# Patient Record
Sex: MALE | Race: ASIAN | NOT HISPANIC OR LATINO | URBAN - METROPOLITAN AREA
[De-identification: names, ages, dates, MRNs, and addresses within clinical notes are randomized per-mention and may not be internally consistent; named-entity substitution may affect disease eponyms.]

---

## 2019-02-22 ENCOUNTER — OFFICE VISIT (OUTPATIENT)
Dept: FAMILY MEDICINE | Facility: CLINIC | Age: 34
End: 2019-02-22
Payer: COMMERCIAL

## 2019-02-22 VITALS
WEIGHT: 186 LBS | SYSTOLIC BLOOD PRESSURE: 101 MMHG | TEMPERATURE: 97.2 F | HEART RATE: 69 BPM | DIASTOLIC BLOOD PRESSURE: 90 MMHG | BODY MASS INDEX: 29.19 KG/M2 | OXYGEN SATURATION: 97 % | HEIGHT: 67 IN

## 2019-02-22 DIAGNOSIS — R51.9 NONINTRACTABLE EPISODIC HEADACHE, UNSPECIFIED HEADACHE TYPE: Primary | ICD-10-CM

## 2019-02-22 PROCEDURE — 99214 OFFICE O/P EST MOD 30 MIN: CPT | Performed by: FAMILY MEDICINE

## 2019-02-22 SDOH — HEALTH STABILITY: MENTAL HEALTH: HOW OFTEN DO YOU HAVE A DRINK CONTAINING ALCOHOL?: NEVER

## 2019-02-22 ASSESSMENT — MIFFLIN-ST. JEOR: SCORE: 1734.93

## 2019-02-22 NOTE — PROGRESS NOTES
SUBJECTIVE:   Felipe Bradley is a 33 year old male who presents to clinic today for the following health issues:      Headache and jaw pain   Onset:  4 days     Description:   Location: unilateral in the left temporal area   Character: dull  Frequency:  varies  Duration:  4 days     Intensity: mild    Progression of Symptoms:  worsening    Accompanying Signs & Symptoms:  Stiff neck: no   Neck or upper back pain: YES  Fever: no   Sinus pressure: no   Nausea or vomiting: no   Dizziness: no   Numbness: no   Weakness: no   Visual changes: no     History:   Head trauma: no   Family history of migraines: no   Previous tests for headaches: no   Neurologist evaluations: no   Able to do daily activities: YES  Wake with a headaches: no   Do headaches wake you up: no   Daily pain medication use: no   Work/school stressors/changes: no     Precipitating factors:   Does light make it worse: no   Does sound make it worse: no     Alleviating factors:  Does sleep help: YES    Therapies Tried and outcome: none     No vision changes.  No association with any light or change in sound frequency.  Denies any hearing issues.  No recent fall or trauma.  No neck stiffness    Problem list and histories reviewed & adjusted, as indicated.      There is no problem list on file for this patient.    No past surgical history on file.    Social History     Tobacco Use     Smoking status: Never Smoker     Smokeless tobacco: Never Used   Substance Use Topics     Alcohol use: No     Frequency: Never     No family history on file.      No current outpatient medications on file.       Reviewed and updated as needed this visit by clinical staff  Allergies  Meds       Reviewed and updated as needed this visit by Provider         ROS:  CONSTITUTIONAL: NEGATIVE for fever, chills, change in weight  ENT/MOUTH: NEGATIVE for ear, mouth and throat problems  RESP: NEGATIVE for significant cough or SOB  CV: NEGATIVE for chest pain, palpitations or peripheral  "edema    OBJECTIVE:                                                    /90   Pulse 69   Temp 97.2  F (36.2  C) (Oral)   Ht 1.69 m (5' 6.54\")   Wt 84.4 kg (186 lb)   SpO2 97%   BMI 29.54 kg/m    Body mass index is 29.54 kg/m .   GENERAL: healthy, alert, well nourished, well hydrated, no distress  HENT: ear canals- normal; TMs- normal; Nose- normal; Mouth- no ulcers, no lesions  NECK: no tenderness, no adenopathy, no asymmetry, no masses, no stiffness; thyroid- normal to palpation  RESP: lungs clear to auscultation - no rales, no rhonchi, no wheezes  CV: regular rates and rhythm, normal S1 S2, no S3 or S4 and no murmur, no click or rub -  ABDOMEN: soft, no tenderness, no  hepatosplenomegaly, no masses, normal bowel sounds  NEURO: strength and tone- normal, sensory exam- grossly normal, mentation- intact, speech- normal, reflexes- symmetric  No rash apparent.     ASSESSMENT/PLAN:                                                        ICD-10-CM    1. Nonintractable episodic headache, unspecified headache type R51 NEUROLOGY ADULT REFERRAL     Patient does not have any neurological symptoms.  There is no vision changes there is no rashes or any other etiology.  Symptoms could be episodic headaches versus stress related headache.  Due to unilateral symptoms and not improving I suggested he can try some ibuprofen and see a neurologist for further evaluation.  Warning signs were discussed with the patient.  Discussed briefly about any rash which appear in that area he needs to be seen immediately.  Other symptoms including unilateral eye discomfort or any headache which is intractable and not improving and worsening in short duration, he needs to be seen sooner.  Brendon Mendenhall MD  [facility] [place]    "

## 2019-11-26 ENCOUNTER — TRANSFERRED RECORDS (OUTPATIENT)
Dept: HEALTH INFORMATION MANAGEMENT | Facility: CLINIC | Age: 34
End: 2019-11-26

## 2019-12-20 ENCOUNTER — HOSPITAL ENCOUNTER (OUTPATIENT)
Dept: CT IMAGING | Facility: CLINIC | Age: 34
Discharge: HOME | End: 2019-12-20
Attending: PSYCHIATRY & NEUROLOGY | Admitting: PSYCHIATRY & NEUROLOGY
Payer: COMMERCIAL

## 2019-12-20 DIAGNOSIS — R22.0 SCALP MASS: ICD-10-CM

## 2019-12-20 DIAGNOSIS — R51.9 HEADACHE: ICD-10-CM

## 2019-12-20 PROCEDURE — 70450 CT HEAD/BRAIN W/O DYE: CPT

## 2020-01-09 ENCOUNTER — PRE VISIT (OUTPATIENT)
Dept: NEUROLOGY | Facility: CLINIC | Age: 35
End: 2020-01-09

## 2020-01-09 NOTE — TELEPHONE ENCOUNTER
FUTURE VISIT INFORMATION      FUTURE VISIT INFORMATION:    Date: 2/10/2020    Time: 2pm    Location: [facility]  REFERRAL INFORMATION:    Referring provider:  Dr. Recio     Referring providers clinic:  [facility]     Reason for visit/diagnosis  Headaches     RECORDS REQUESTED FROM:       Clinic name Comments Records Status Imaging Status   [facility]   Scanned to media tab N/A     * No Records in Care Everywhere*

## 2020-01-23 ENCOUNTER — TRANSFERRED RECORDS (OUTPATIENT)
Dept: HEALTH INFORMATION MANAGEMENT | Facility: CLINIC | Age: 35
End: 2020-01-23

## 2020-02-10 ENCOUNTER — OFFICE VISIT (OUTPATIENT)
Dept: NEUROLOGY | Facility: CLINIC | Age: 35
End: 2020-02-10
Attending: FAMILY MEDICINE
Payer: COMMERCIAL

## 2020-02-10 VITALS
SYSTOLIC BLOOD PRESSURE: 130 MMHG | HEART RATE: 73 BPM | OXYGEN SATURATION: 98 % | WEIGHT: 187.39 LBS | DIASTOLIC BLOOD PRESSURE: 85 MMHG | BODY MASS INDEX: 29.76 KG/M2

## 2020-02-10 DIAGNOSIS — R93.0 ABNORMAL HEAD CT: ICD-10-CM

## 2020-02-10 DIAGNOSIS — G44.209 TENSION HEADACHE: Primary | ICD-10-CM

## 2020-02-10 RX ORDER — AMITRIPTYLINE HYDROCHLORIDE 10 MG/1
10 TABLET ORAL AT BEDTIME
Qty: 90 TABLET | Refills: 3 | Status: SHIPPED | OUTPATIENT
Start: 2020-02-10 | End: 2021-04-06

## 2020-02-10 ASSESSMENT — ENCOUNTER SYMPTOMS
TASTE DISTURBANCE: 0
SMELL DISTURBANCE: 0
POOR WOUND HEALING: 0
SINUS CONGESTION: 0
SORE THROAT: 0
TROUBLE SWALLOWING: 0
SINUS PAIN: 0
SKIN CHANGES: 0
NAIL CHANGES: 1
HOARSE VOICE: 0
NECK MASS: 0

## 2020-02-10 ASSESSMENT — PAIN SCALES - GENERAL: PAINLEVEL: MILD PAIN (3)

## 2020-02-10 ASSESSMENT — HEADACHE IMPACT TEST (HIT 6)
HOW OFTEN DO HEADACHES LIMIT YOUR DAILY ACTIVITIES: NEVER
HOW OFTEN HAVE YOU FELT FED UP OR IRRITATED BECAUSE OF YOUR HEADACHES: RARELY
HIT6 TOTAL SCORE: 40
WHEN YOU HAVE A HEADACHE HOW OFTEN DO YOU WISH YOU COULD LIE DOWN: NEVER
HOW OFTEN HAVE YOU FELT TOO TIRED TO WORK BECAUSE OF YOUR HEADACHES: NEVER
HOW OFTEN DID HEADACHS LIMIT CONCENTRATION ON WORK OR DAILY ACTIVITY: NEVER
WHEN YOU HAVE HEADACHES HOW OFTEN IS THE PAIN SEVERE: RARELY

## 2020-02-10 NOTE — PROGRESS NOTES
[facility]    Felipe Bradley MRN# [id number]   Age: 34 year old YOB: 1985     Requesting physician: Brendon Tejeda            Assessment and Plan:     1) Tension Headache  2) Abnormal head CT    I saw the patient accompanied by Dr. Spangler, PGY 4.  He has documented the HPI and physical examination and I have edited for content and accuracy. I have personally confirmed all details of history, examination and assessment and plan are my own.    The patient is describing a ocular contracture headache or tension headache that is running along the right side of his head.  He was reassured that it sounds benign in nature.  Unfortunately I think the anxiety over the palpable bump he has in his skull is making him feel much worse and triggering the headaches.    I reviewed the images with the patient on CT scan of the head.  Apparently he had a CT scan of the head back in 2015 and we will try to obtain those images to compare.  If not to provide further reassurance to the patient this is a benign lesion we can do serial monitoring with a repeat CT scan in June.    In the meantime the patient was recommended if he has difficulty managing his anxiety he should see his primary care physician.    To help with the headaches patient will be initiated on amitriptyline 10 mg p.o. nightly.  He was counseled regarding possible side effects related to this medication.  If this does not work a trial of gabapentin could be undertaken.    I will call the patient once I have outside images to compare.  No further work-up is suggested at this time and no further neurological follow-up is scheduled until outside images have been reviewed.    Shruti Singh MD [facility]  Department of Neurology  Pager [phone]             History of Present Illness:     chief complaint: Headache     Felipe Bradley is a 34 year old gentleman presenting for assessment of headache since summer 2019.  The headache is in  association with a bump that the patient palpates on the right frontal aspect of the head behind the hairline.  He initially presented to [facility], [facility] for evaluation of these headaches and the bump.  An MRI scan of the brain was initially performed followed by a CT scan.  The CT scan is the only image we have available for review today and this was performed December 20, 2019.  There is a right frontal bone abnormality measuring 1.3 cm x 0.8 cm in the outer cortex of the right frontal bone.  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  The patient reports that he has significant distress over this bump despite having been told it is benign.  He reports that numerous family members have expressed concern that no one is doing anything regarding this possible benign tumor.  He reports that he is delayed purchasing a house due to fear that this is something that will shorten his life span and make quality of life difficult.  Unfortunately in that setting he is noticed that his headaches have become worse and more disruptive to life.    Current headache symptoms:  Frequency: weeks at a time where it will flare up especially since November 2019. Will occur typically 2 weeks per month. It has now been constant now for last 2 days.  Quality: achy, pulling sensation  Location: right side of head, radiates to back of neck, ear, and jaw the patient traces a pattern that follows musculature  Duration: Typically 2-3 days but up to weeks  Associated symptoms: No photophobia, no phonophobia, nausea, vomiting. No autonomic symptoms associated with headache. No aura.   Awakens from sleep due to sx's:  No  Precipitating Injury:  No      No exacerbating factors or triggers per the patient.  History suggests anxiety is provoking worse headaches. No history of teeth grinding.  Pain improves on its own. No medication required.  Pain is bothersome but has not  missed work or other functions.  The patient is not sleeping well due to anxiousness. He stated his family is worried and he has put buying a house on hold due to the headache.  He stated his stress is currently a 7/10 due to headache.    Previous Treatment Trials:  Medrol dose pack (no improvement)      Caffeine: 1x week  Sleep: 7-8 hours   Exercise: minimal  H2O: 2 20oz per day         Physical Exam:   /85 (BP Location: Left arm, Patient Position: Sitting, Cuff Size: Adult Large)   Pulse 73   Wt 85 kg (187 lb 6.3 oz)   SpO2 98%   BMI 29.76 kg/m      General Appearance: Sitting upright in chair and in no acute distress  Neurological Examination:  Cognition and Orientation: The patient is oriented to person, place and self. Normal attention and recall. No aphasia or dysarthria  Cranial Nerves: 2-12 intact. Funduscopic examination was normal with sharp disc margins visualized.  General Motor Survey: The patient has normal muscle bulk, tone and strength in all four extremities. No tremor present.   Reflexes: Upper and lower extremity reflexes are within normal limits and bilaterally symmetric. Plantar responses are flexor.  Coordination: Normal coordination of finger to nose and heel-knee-shin.   Gait: Normal gait. Romberg negative. Able to walk on toes with normal tandem walk  Sensory: Normal sensation to light touch in all four extremities  Cardiovascular Examination:   Regular rate and rhythm of heart. No significant edema in lower extremities.   Musculoskeletal examination:  Neck range of motion is normal.  The patient sits with poor posture.  He has some additional muscle spasm in his right masseter muscle and mild hypertrophy when compared to the left.         Data:   All laboratory data reviewed  All imaging studies reviewed by me    DATA for DOCUMENTATION:    CT head w/o contrast (12/20/19): normal brain parenchyma. 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          Past Medical History:     H/o panic attacks/Anxiety    Also see scanned health assessment forms.       Past Surgical History:   No prior surgery         Social History:     Social History     Socioeconomic History     Marital status:      Spouse name: Not on file     Number of children: Not on file     Years of education: Not on file     Highest education level: Not on file   Occupational History     Not on file   Social Needs     Financial resource strain: Not on file     Food insecurity:     Worry: Not on file     Inability: Not on file     Transportation needs:     Medical: Not on file     Non-medical: Not on file   Tobacco Use     Smoking status: Never Smoker     Smokeless tobacco: Never Used   Substance and Sexual Activity     Alcohol use: No     Frequency: Never     Drug use: No     Sexual activity: Yes   Lifestyle     Physical activity:     Days per week: Not on file     Minutes per session: Not on file     Stress: Not on file   Relationships     Social connections:     Talks on phone: Not on file     Gets together: Not on file     Attends [employer] service: Not on file     Active member of club or organization: Not on file     Attends meetings of clubs or organizations: Not on file     Relationship status: Not on file     Intimate partner violence:     Fear of current or ex partner: Not on file     Emotionally abused: Not on file     Physically abused: Not on file     Forced sexual activity: Not on file   Other Topics Concern     Not on file   Social History Narrative     Not on file     No smoking or alcohol use         Family History:   No family history of headache  Father has history of Coronary artery disease         Medications:     No active medications         Review of Systems:   A comprehensive 10 point review of systems (constitutional, ENT, cardiac, peripheral vascular, lymphatic, respiratory, GI, , Musculoskeletal, skin, Neurological) was performed and found to be negative except as  described in this note.     See intake form completed by patient    Answers for HPI/ROS submitted by the patient on 2/10/2020   General Symptoms: No  Skin Symptoms: Yes  HENT Symptoms: Yes  EYE SYMPTOMS: No  HEART SYMPTOMS: No  LUNG SYMPTOMS: No  INTESTINAL SYMPTOMS: No  URINARY SYMPTOMS: No  REPRODUCTIVE SYMPTOMS: No  SKELETAL SYMPTOMS: No  BLOOD SYMPTOMS: No  NERVOUS SYSTEM SYMPTOMS: No  MENTAL HEALTH SYMPTOMS: No  Changes in hair: No  Changes in moles/birth marks: No  Itching: No  Rashes: No  Changes in nails: Yes  Acne: No  Change in facial hair: No  Warts: No  Non-healing sores: No  Scarring: No  Flaking of skin: No  Color changes of hands/feet in cold : No  Sun sensitivity: No  Skin thickening: No  Ear pain: No  Ear discharge: No  Hearing loss: No  Tinnitus: No  Nosebleeds: No  Congestion: No  Sinus pain: No  Trouble swallowing: No   Voice hoarseness: No  Mouth sores: No  Sore throat: No  Tooth pain: No  Gum tenderness: No  Bleeding gums: No  Change in taste: No  Change in sense of smell: No  Dry mouth: No  Hearing aid used: No  Neck lump: No

## 2020-02-10 NOTE — NURSING NOTE
Chief Complaint   Patient presents with     Headache     [facility] NEW - HEADACHES       Carlos Alberto De Anda, EMT

## 2020-02-10 NOTE — LETTER
2/10/2020       RE: Felipe Bradley  [address]  [place] [address]     Dear Colleague,    Thank you for referring your patient, Felipe Bradley, to the [facility] NEUROLOGY at [facility]. Please see a copy of my visit note below.        [facility]    Felipe Bradley MRN# [id number]   Age: 34 year old YOB: 1985     Requesting physician: Brendon Tejeda            Assessment and Plan:     1) Tension Headache  2) Abnormal head CT    I saw the patient accompanied by Dr. Spangler, PGY 4.  He has documented the HPI and physical examination and I have edited for content and accuracy. I have personally confirmed all details of history, examination and assessment and plan are my own.    The patient is describing a ocular contracture headache or tension headache that is running along the right side of his head.  He was reassured that it sounds benign in nature.  Unfortunately I think the anxiety over the palpable bump he has in his skull is making him feel much worse and triggering the headaches.    I reviewed the images with the patient on CT scan of the head.  Apparently he had a CT scan of the head back in 2015 and we will try to obtain those images to compare.  If not to provide further reassurance to the patient this is a benign lesion we can do serial monitoring with a repeat CT scan in June.    In the meantime the patient was recommended if he has difficulty managing his anxiety he should see his primary care physician.    To help with the headaches patient will be initiated on amitriptyline 10 mg p.o. nightly.  He was counseled regarding possible side effects related to this medication.  If this does not work a trial of gabapentin could be undertaken.    I will call the patient once I have outside images to compare.  No further work-up is suggested at this time and no further neurological follow-up is scheduled until outside images have  been reviewed.    Shruti Singh MD [facility]  Department of Neurology  Pager [phone]             History of Present Illness:     chief complaint: Headache     Felipe Bradley is a 34 year old gentleman presenting for assessment of headache since summer 2019.  The headache is in association with a bump that the patient palpates on the right frontal aspect of the head behind the hairline.  He initially presented to [facility], [facility] for evaluation of these headaches and the bump.  An MRI scan of the brain was initially performed followed by a CT scan.  The CT scan is the only image we have available for review today and this was performed December 20, 2019.  There is a right frontal bone abnormality measuring 1.3 cm x 0.8 cm in the outer cortex of the right frontal bone.  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  The patient reports that he has significant distress over this bump despite having been told it is benign.  He reports that numerous family members have expressed concern that no one is doing anything regarding this possible benign tumor.  He reports that he is delayed purchasing a house due to fear that this is something that will shorten his life span and make quality of life difficult.  Unfortunately in that setting he is noticed that his headaches have become worse and more disruptive to life.    Current headache symptoms:  Frequency: weeks at a time where it will flare up especially since November 2019. Will occur typically 2 weeks per month. It has now been constant now for last 2 days.  Quality: achy, pulling sensation  Location: right side of head, radiates to back of neck, ear, and jaw the patient traces a pattern that follows musculature  Duration: Typically 2-3 days but up to weeks  Associated symptoms: No photophobia, no phonophobia, nausea, vomiting. No autonomic symptoms associated with headache. No aura.   Awakens from  sleep due to sx's:  No  Precipitating Injury:  No      No exacerbating factors or triggers per the patient.  History suggests anxiety is provoking worse headaches. No history of teeth grinding.  Pain improves on its own. No medication required.  Pain is bothersome but has not missed work or other functions.  The patient is not sleeping well due to anxiousness. He stated his family is worried and he has put buying a house on hold due to the headache.  He stated his stress is currently a 7/10 due to headache.    Previous Treatment Trials:  Medrol dose pack (no improvement)      Caffeine: 1x week  Sleep: 7-8 hours   Exercise: minimal  H2O: 2 20oz per day         Physical Exam:   /85 (BP Location: Left arm, Patient Position: Sitting, Cuff Size: Adult Large)   Pulse 73   Wt 85 kg (187 lb 6.3 oz)   SpO2 98%   BMI 29.76 kg/m       General Appearance: Sitting upright in chair and in no acute distress  Neurological Examination:  Cognition and Orientation: The patient is oriented to person, place and self. Normal attention and recall. No aphasia or dysarthria  Cranial Nerves: 2-12 intact. Funduscopic examination was normal with sharp disc margins visualized.  General Motor Survey: The patient has normal muscle bulk, tone and strength in all four extremities. No tremor present.   Reflexes: Upper and lower extremity reflexes are within normal limits and bilaterally symmetric. Plantar responses are flexor.  Coordination: Normal coordination of finger to nose and heel-knee-shin.   Gait: Normal gait. Romberg negative. Able to walk on toes with normal tandem walk  Sensory: Normal sensation to light touch in all four extremities  Cardiovascular Examination:   Regular rate and rhythm of heart. No significant edema in lower extremities.   Musculoskeletal examination:  Neck range of motion is normal.  The patient sits with poor posture.  He has some additional muscle spasm in his right masseter muscle and mild hypertrophy  when compared to the left.         Data:   All laboratory data reviewed  All imaging studies reviewed by me    DATA for DOCUMENTATION:    CT head w/o contrast (12/20/19): normal brain parenchyma. 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         Past Medical History:     H/o panic attacks/Anxiety    Also see scanned health assessment forms.       Past Surgical History:   No prior surgery         Social History:     Social History     Socioeconomic History     Marital status:      Spouse name: Not on file     Number of children: Not on file     Years of education: Not on file     Highest education level: Not on file   Occupational History     Not on file   Social Needs     Financial resource strain: Not on file     Food insecurity:     Worry: Not on file     Inability: Not on file     Transportation needs:     Medical: Not on file     Non-medical: Not on file   Tobacco Use     Smoking status: Never Smoker     Smokeless tobacco: Never Used   Substance and Sexual Activity     Alcohol use: No     Frequency: Never     Drug use: No     Sexual activity: Yes   Lifestyle     Physical activity:     Days per week: Not on file     Minutes per session: Not on file     Stress: Not on file   Relationships     Social connections:     Talks on phone: Not on file     Gets together: Not on file     Attends Rastafarian service: Not on file     Active member of club or organization: Not on file     Attends meetings of clubs or organizations: Not on file     Relationship status: Not on file     Intimate partner violence:     Fear of current or ex partner: Not on file     Emotionally abused: Not on file     Physically abused: Not on file     Forced sexual activity: Not on file   Other Topics Concern     Not on file   Social History Narrative     Not on file     No smoking or alcohol use         Family History:   No family history of headache  Father has history of Coronary artery disease          Medications:     No active medications         Review of Systems:   A comprehensive 10 point review of systems (constitutional, ENT, cardiac, peripheral vascular, lymphatic, respiratory, GI, , Musculoskeletal, skin, Neurological) was performed and found to be negative except as described in this note.     See intake form completed by patient    Again, thank you for allowing me to participate in the care of your patient.      Sincerely,    Shruti Singh MD

## 2020-09-21 ENCOUNTER — TELEPHONE (OUTPATIENT)
Dept: NEUROLOGY | Facility: CLINIC | Age: 35
End: 2020-09-21

## 2020-09-21 NOTE — TELEPHONE ENCOUNTER
[facility] Call Center    Phone Message    May a detailed message be left on voicemail: yes     Reason for Call: Medication Question or concern regarding medication   Prescription Clarification  Name of Medication: amitriptyline (ELAVIL) 10 MG tablet [1127] (Order [id number])   Prescribing Provider: Dr. Singh    What on the order needs clarification? Patient is calling to talk with Dr. Singh - Patient stated that he stopped the medication due to trying to have a baby. Patient stated that he is starting to get headaches again is wanting Dr. Singh advice.    Please advise          Action Taken: Other:  NEUROLOGY     Travel Screening: Not Applicable

## 2020-09-21 NOTE — TELEPHONE ENCOUNTER
I called pt to discuss his phone message. He let me know he stopped his amitriptyline a month ago when him and his wife started trying to have a baby. He is wondering if he does need to stop amitriptyline while trying to conceive or if he can resume taking it. Prior to stopping his headaches were well managed.     If he should stop what recommendations does the provider have.     I will pass his questions along to Dr. Singh and let pt know what she advices.     Georgina BOBO

## 2020-09-22 ENCOUNTER — TELEPHONE (OUTPATIENT)
Dept: NEUROLOGY | Facility: CLINIC | Age: 35
End: 2020-09-22

## 2020-09-22 NOTE — TELEPHONE ENCOUNTER
"I called pt with an update from Dr. Singh regarding his amitriptyline. \"The patient is safe to take medicine while he and his wife are trying to conceive. It would not affect the baby's health. I would suggest he restart \". He understands and will restart the medication. I let him know he can call back if he has any other questions or has continued headache.     Georgina BOBO  "

## 2021-02-20 ENCOUNTER — TELEPHONE (OUTPATIENT)
Dept: NEUROLOGY | Facility: CLINIC | Age: 36
End: 2021-02-20

## 2021-02-20 DIAGNOSIS — G44.209 TENSION HEADACHE: Primary | ICD-10-CM

## 2021-02-20 RX ORDER — AMITRIPTYLINE HYDROCHLORIDE 10 MG/1
10 TABLET ORAL AT BEDTIME
Qty: 30 TABLET | Refills: 0 | Status: SHIPPED | OUTPATIENT
Start: 2021-02-20 | End: 2021-04-06

## 2021-02-20 NOTE — TELEPHONE ENCOUNTER
Patient called today requesting a refill of his amitryptiline 10 mg at bedtime for headaches. I spoke to him on the phone. Apparently he has not seen Dr Singh in the headache clinic for over a year (last visit 2/10/2020). I will provide him a 30 day refill and I explained that he should call the Clinic to make an appointment or we can't provide further refills. He verbalized understanding. GM

## 2021-04-06 ENCOUNTER — OFFICE VISIT (OUTPATIENT)
Dept: NEUROLOGY | Facility: CLINIC | Age: 36
End: 2021-04-06
Payer: COMMERCIAL

## 2021-04-06 VITALS
DIASTOLIC BLOOD PRESSURE: 80 MMHG | WEIGHT: 190 LBS | RESPIRATION RATE: 16 BRPM | HEART RATE: 77 BPM | SYSTOLIC BLOOD PRESSURE: 119 MMHG | OXYGEN SATURATION: 98 % | BODY MASS INDEX: 30.18 KG/M2

## 2021-04-06 DIAGNOSIS — G44.219 EPISODIC TENSION-TYPE HEADACHE, NOT INTRACTABLE: Primary | ICD-10-CM

## 2021-04-06 PROCEDURE — 99213 OFFICE O/P EST LOW 20 MIN: CPT | Performed by: PSYCHIATRY & NEUROLOGY

## 2021-04-06 RX ORDER — AMITRIPTYLINE HYDROCHLORIDE 10 MG/1
10 TABLET ORAL AT BEDTIME
Qty: 90 TABLET | Refills: 3 | Status: SHIPPED | OUTPATIENT
Start: 2021-04-06 | End: 2022-07-18

## 2021-04-06 ASSESSMENT — PAIN SCALES - GENERAL: PAINLEVEL: NO PAIN (0)

## 2021-04-06 NOTE — PROGRESS NOTES
[facility]    Felipe Bradley MRN# [id number]   Age: 36 year old YOB: 1985     Requesting physician: Referred Self  Brendon Mendenhall            Assessment and Plan:     (G44.219) Episodic tension-type headache, not intractable  (primary encounter diagnosis)  Comment: Currently under excellent control the patient will continue on amitriptyline.  Plan: amitriptyline (ELAVIL) 10 MG tablet         No need for head imaging at this time.  If the patient experiences a change in his headaches or new neurologic symptoms certainly we can follow the skull defect.    20 minutes spent with the patient over 50% counseling.  Shruti Singh MD           History of Present Illness:   CC: Recheck for headache    Mr Bradley is a 36-year-old gentleman was last seen in February 2020.  At that time he was initiated on amitriptyline 10 mg p.o. nightly for headache symptoms that started in the summer 2019.  The patient reports the amitriptyline is helped substantially.  The headache has gone from spreading all over the head and being nightly to being an occasional once or twice a month phenomenon just over the right frontal head region.  He denies any side effects from the amitriptyline.  He did try and stop it at one point in time and the headaches came right back.    The patient does have a right frontal skull bone abnormality.  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    The patient and his wife are expecting their first child this summer.             Physical Exam:     /80   Pulse 77   Resp 16   Wt 86.2 kg (190 lb)   SpO2 98%   BMI 30.18 kg/m    General appearance: The patient is well-groomed and cooperative with examination.  He is in no acute distress  Neurological examination: The patient is awake and alert.  He is oriented.  No aphasia or dysarthria.  Cranial nerves II through XII intact.         Pertinent History/Data for appointment:

## 2021-04-06 NOTE — NURSING NOTE
Chief Complaint   Patient presents with     RECHECK     [facility] RETURN HEADACHE      Williams Saldana

## 2021-04-06 NOTE — LETTER
4/6/2021       RE: Felipe Bradley  [address]  [place] [address]     Dear Colleague,    Thank you for referring your patient, Felipe Bradley, to the [facility] [place] at [facility]. Please see a copy of my visit note below.        [facility]    Felipe Bradley MRN# [id number]   Age: 36 year old YOB: 1985     Requesting physician: Referred Self  Brendon Mendenhall            Assessment and Plan:     (G44.219) Episodic tension-type headache, not intractable  (primary encounter diagnosis)  Comment: Currently under excellent control the patient will continue on amitriptyline.  Plan: amitriptyline (ELAVIL) 10 MG tablet         No need for head imaging at this time.  If the patient experiences a change in his headaches or new neurologic symptoms certainly we can follow the skull defect.    20 minutes spent with the patient over 50% counseling.  Shruti Singh MD           History of Present Illness:   CC: Recheck for headache    Mr Bradley is a 36-year-old gentleman was last seen in February 2020.  At that time he was initiated on amitriptyline 10 mg p.o. nightly for headache symptoms that started in the summer 2019.  The patient reports the amitriptyline is helped substantially.  The headache has gone from spreading all over the head and being nightly to being an occasional once or twice a month phenomenon just over the right frontal head region.  He denies any side effects from the amitriptyline.  He did try and stop it at one point in time and the headaches came right back.    The patient does have a right frontal skull bone abnormality.  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    The patient and his wife are expecting their first child this summer.           Physical Exam:     /80   Pulse 77   Resp 16   Wt 86.2 kg (190 lb)   SpO2 98%   BMI 30.18 kg/m    General  appearance: The patient is well-groomed and cooperative with examination.  He is in no acute distress  Neurological examination: The patient is awake and alert.  He is oriented.  No aphasia or dysarthria.  Cranial nerves II through XII intact.         Pertinent History/Data for appointment:       Again, thank you for allowing me to participate in the care of your patient.      Sincerely,    Shruti Singh MD

## 2022-07-18 ENCOUNTER — OFFICE VISIT (OUTPATIENT)
Dept: FAMILY MEDICINE | Facility: CLINIC | Age: 37
End: 2022-07-18
Payer: COMMERCIAL

## 2022-07-18 VITALS
TEMPERATURE: 98.3 F | RESPIRATION RATE: 12 BRPM | DIASTOLIC BLOOD PRESSURE: 70 MMHG | OXYGEN SATURATION: 98 % | WEIGHT: 181 LBS | HEIGHT: 68 IN | SYSTOLIC BLOOD PRESSURE: 110 MMHG | BODY MASS INDEX: 27.43 KG/M2 | HEART RATE: 76 BPM

## 2022-07-18 DIAGNOSIS — Z00.00 ROUTINE GENERAL MEDICAL EXAMINATION AT A HEALTH CARE FACILITY: Primary | ICD-10-CM

## 2022-07-18 DIAGNOSIS — Z11.4 SCREENING FOR HIV (HUMAN IMMUNODEFICIENCY VIRUS): ICD-10-CM

## 2022-07-18 DIAGNOSIS — L91.8 SKIN TAG: ICD-10-CM

## 2022-07-18 DIAGNOSIS — Z83.3 FAMILY HISTORY OF DIABETES MELLITUS: ICD-10-CM

## 2022-07-18 DIAGNOSIS — Z13.220 SCREENING FOR HYPERLIPIDEMIA: ICD-10-CM

## 2022-07-18 DIAGNOSIS — Z11.59 NEED FOR HEPATITIS C SCREENING TEST: ICD-10-CM

## 2022-07-18 LAB
ERYTHROCYTE [DISTWIDTH] IN BLOOD BY AUTOMATED COUNT: 12.8 % (ref 10–15)
HBA1C MFR BLD: 5.6 % (ref 0–5.6)
HCT VFR BLD AUTO: 44.4 % (ref 40–53)
HGB BLD-MCNC: 15.6 G/DL (ref 13.3–17.7)
MCH RBC QN AUTO: 29.4 PG (ref 26.5–33)
MCHC RBC AUTO-ENTMCNC: 35.1 G/DL (ref 31.5–36.5)
MCV RBC AUTO: 84 FL (ref 78–100)
PLATELET # BLD AUTO: 213 10E3/UL (ref 150–450)
RBC # BLD AUTO: 5.3 10E6/UL (ref 4.4–5.9)
WBC # BLD AUTO: 7.4 10E3/UL (ref 4–11)

## 2022-07-18 PROCEDURE — 83036 HEMOGLOBIN GLYCOSYLATED A1C: CPT | Performed by: FAMILY MEDICINE

## 2022-07-18 PROCEDURE — 80053 COMPREHEN METABOLIC PANEL: CPT | Performed by: FAMILY MEDICINE

## 2022-07-18 PROCEDURE — 36415 COLL VENOUS BLD VENIPUNCTURE: CPT | Performed by: FAMILY MEDICINE

## 2022-07-18 PROCEDURE — 85027 COMPLETE CBC AUTOMATED: CPT | Performed by: FAMILY MEDICINE

## 2022-07-18 PROCEDURE — 86803 HEPATITIS C AB TEST: CPT | Performed by: FAMILY MEDICINE

## 2022-07-18 PROCEDURE — 99385 PREV VISIT NEW AGE 18-39: CPT | Mod: 25 | Performed by: FAMILY MEDICINE

## 2022-07-18 PROCEDURE — 90715 TDAP VACCINE 7 YRS/> IM: CPT | Performed by: FAMILY MEDICINE

## 2022-07-18 PROCEDURE — 90471 IMMUNIZATION ADMIN: CPT | Performed by: FAMILY MEDICINE

## 2022-07-18 PROCEDURE — 87389 HIV-1 AG W/HIV-1&-2 AB AG IA: CPT | Performed by: FAMILY MEDICINE

## 2022-07-18 PROCEDURE — 80061 LIPID PANEL: CPT | Performed by: FAMILY MEDICINE

## 2022-07-18 NOTE — PROGRESS NOTES
SUBJECTIVE:   CC: Felipe Bradley is an 37 year old male who presents for preventative health visit.     He has no concerns today.         Patient has been advised of split billing requirements and indicates understanding: Yes  Healthy Habits:     Getting at least 3 servings of Calcium per day:  Yes    Bi-annual eye exam:  Yes    Dental care twice a year:  Yes    Sleep apnea or symptoms of sleep apnea:  None    Diet:  Regular (no restrictions)    Frequency of exercise:  None    Taking medications regularly:  Not Applicable    Medication side effects:  Not applicable    PHQ-2 Total Score: 0    Additional concerns today:  Yes      Today's PHQ-2 Score:   PHQ-2 ( 1999 Pfizer) 7/18/2022   Q1: Little interest or pleasure in doing things 0   Q2: Feeling down, depressed or hopeless 0   PHQ-2 Score 0   PHQ-2 Total Score (12-17 Years)- Positive if 3 or more points; Administer PHQ-A if positive -   Q1: Little interest or pleasure in doing things Not at all   Q2: Feeling down, depressed or hopeless Not at all   PHQ-2 Score 0       Abuse: Current or Past(Physical, Sexual or Emotional)- No  Do you feel safe in your environment? Yes    Have you ever done Advance Care Planning? (For example, a Health Directive, POLST, or a discussion with a medical provider or your loved ones about your wishes): No, advance care planning information given to patient to review.  Patient declined advance care planning discussion at this time.    Social History     Tobacco Use     Smoking status: Former Smoker     Smokeless tobacco: Never Used   Substance Use Topics     Alcohol use: No       Alcohol Use 7/18/2022   Prescreen: >3 drinks/day or >7 drinks/week? Not Applicable     Last PSA: No results found for: PSA    Reviewed orders with patient. Reviewed health maintenance and updated orders accordingly - Yes  Labs reviewed in EPIC    Reviewed and updated as needed this visit by clinical staff                  Past Medical History:   Diagnosis Date      Anxiety 2020     Tension headache 2020    resolved - not sure why this happened - maybe stress       Past Surgical History:   Procedure Laterality Date     NO HISTORY OF SURGERY         MEDICATIONS:  No current outpatient medications on file.     No current facility-administered medications for this visit.       SOCIAL HISTORY:  Social History     Tobacco Use     Smoking status: Former Smoker     Smokeless tobacco: Never Used     Tobacco comment: smoked for 6 years and then stopped   Substance Use Topics     Alcohol use: No       Family History   Problem Relation Age of Onset     Diabetes Type 2  Mother 64     Coronary Artery Disease Father 58         Review of Systems  CONSTITUTIONAL: NEGATIVE for fever, chills, change in weight  INTEGUMENTARY/SKIN: NEGATIVE for worrisome rashes, moles or lesions  EYES: NEGATIVE for vision changes or irritation  ENT: NEGATIVE for ear, mouth and throat problems  RESP: NEGATIVE for significant cough or SOB  CV: NEGATIVE for chest pain, palpitations or peripheral edema  GI: NEGATIVE for nausea, abdominal pain, heartburn, or change in bowel habits   male: negative for dysuria, hematuria, decreased urinary stream, erectile dysfunction, urethral discharge  MUSCULOSKELETAL: NEGATIVE for significant arthralgias or myalgia  NEURO: NEGATIVE for weakness, dizziness or paresthesias  PSYCHIATRIC: NEGATIVE for changes in mood or affect    OBJECTIVE:   There were no vitals taken for this visit.    Physical Exam  GENERAL: healthy, alert and no distress  EYES: Eyes grossly normal to inspection, PERRL and conjunctivae and sclerae normal  HENT: ear canals and TM's normal, nose and mouth without ulcers or lesions  NECK: no adenopathy, no asymmetry, masses, or scars and thyroid normal to palpation  RESP: lungs clear to auscultation - no rales, rhonchi or wheezes  CV: regular rate and rhythm, normal S1 S2, no S3 or S4, no murmur, click or rub, no peripheral edema and peripheral pulses strong  ABDOMEN:  "soft, nontender, no hepatosplenomegaly, no masses and bowel sounds normal  MS: no gross musculoskeletal defects noted, no edema  SKIN: in groin creases there is some mild skin darkening and scale and 3 skin tags - largest about 6 mm and small about 1-2 mm  NEURO: Normal strength and tone, mentation intact and speech normal  PSYCH: mentation appears normal, affect normal/bright  LYMPH: no cervical, supraclavicular, axillary, or inguinal adenopathy    Diagnostic Test Results:  Labs reviewed in Epic    ASSESSMENT/PLAN:   (Z00.00) Routine general medical examination at a health care facility  (primary encounter diagnosis)  Comment:   Plan: REVIEW OF HEALTH MAINTENANCE PROTOCOL ORDERS,         TDAP VACCINE (Adacel, Boostrix), Comprehensive         metabolic panel (BMP + Alb, Alk Phos, ALT, AST,        Total. Bili, TP), CBC with platelets            (Z11.4) Screening for HIV (human immunodeficiency virus)  Comment:   Plan: HIV Antigen Antibody Combo            (Z11.59) Need for hepatitis C screening test  Comment:   Plan: Hepatitis C Screen Reflex to HCV RNA Quant and         Genotype            (Z13.220) Screening for hyperlipidemia  Comment:   Plan: Lipid panel reflex to direct LDL Fasting            (Z83.3) Family history of diabetes mellitus  Comment:   Plan: Hemoglobin A1c, Comprehensive metabolic panel         (BMP + Alb, Alk Phos, ALT, AST, Total. Bili,         TP)            (L91.8) Skin tag  Comment:   Plan: PRIMARY CARE FOLLOW-UP SCHEDULING        Schedule future visit for removal          COUNSELING:   Reviewed preventive health counseling, as reflected in patient instructions  Special attention given to:        Regular exercise       Healthy diet/nutrition    Estimated body mass index is 30.18 kg/m  as calculated from the following:    Height as of 2/22/19: 1.69 m (5' 6.54\").    Weight as of 4/6/21: 86.2 kg (190 lb).     Weight management plan: Discussed healthy diet and exercise guidelines    He reports that " he has quit smoking. He has never used smokeless tobacco.      Counseling Resources:  ATP IV Guidelines  Pooled Cohorts Equation Calculator  FRAX Risk Assessment  ICSI Preventive Guidelines  Dietary Guidelines for Americans, 2010  USDA's MyPlate  ASA Prophylaxis  Lung CA Screening    Jackie Kay MD  [facility]

## 2022-07-18 NOTE — LETTER
July 20, 2022      Felipe Bradley  [address] [place] [id number]        Dear ,    We are writing to inform you of your test results.    The labs are good overall.  The blood count is good.   There is NO diabetes.   The metabolic panel that check kidney and liver function is good.   The HIV and hepatitis C are negative.   The lipid panel is pretty good.   The only thing slightly off is the HDL which is the good cholesterol and if it is higher it is better.   There is not a lot to do for that as it is very genetic.   More exercise can help.   Check again in 5 years      Resulted Orders   HIV Antigen Antibody Combo   Result Value Ref Range    HIV Antigen Antibody Combo Nonreactive Nonreactive      Comment:      HIV-1 p24 Ag & HIV-1/HIV-2 Ab Not Detected   Hepatitis C Screen Reflex to HCV RNA Quant and Genotype   Result Value Ref Range    Hepatitis C Antibody Nonreactive Nonreactive    Narrative    Assay performance characteristics have not been established for newborns, infants, and children.   Lipid panel reflex to direct LDL Fasting   Result Value Ref Range    Cholesterol 167 <200 mg/dL    Triglycerides 216 (H) <150 mg/dL    Direct Measure HDL 32 (L) >=40 mg/dL    LDL Cholesterol Calculated 92 <=100 mg/dL    Non HDL Cholesterol 135 (H) <130 mg/dL    Patient Fasting > 8hrs? Unknown     Narrative    Cholesterol  Desirable:  <200 mg/dL    Triglycerides  Normal:  Less than 150 mg/dL  Borderline High:  150-199 mg/dL  High:  200-499 mg/dL  Very High:  Greater than or equal to 500 mg/dL    Direct Measure HDL  Female:  Greater than or equal to 50 mg/dL   Male:  Greater than or equal to 40 mg/dL    LDL Cholesterol  Desirable:  <100mg/dL  Above Desirable:  100-129 mg/dL   Borderline High:  130-159 mg/dL   High:  160-189 mg/dL   Very High:  >= 190 mg/dL    Non HDL Cholesterol  Desirable:  130 mg/dL  Above Desirable:  130-159 mg/dL  Borderline High:  160-189 mg/dL  High:  190-219 mg/dL  Very High:  Greater than  or equal to 220 mg/dL   Hemoglobin A1c   Result Value Ref Range    Hemoglobin A1C 5.6 0.0 - 5.6 %      Comment:      Normal <5.7%   Prediabetes 5.7-6.4%    Diabetes 6.5% or higher     Note: Adopted from ADA consensus guidelines.   Comprehensive metabolic panel (BMP + Alb, Alk Phos, ALT, AST, Total. Bili, TP)   Result Value Ref Range    Sodium 139 133 - 144 mmol/L    Potassium 4.9 3.4 - 5.3 mmol/L    Chloride 106 94 - 109 mmol/L    Carbon Dioxide (CO2) 24 20 - 32 mmol/L    Anion Gap 9 3 - 14 mmol/L    Urea Nitrogen 16 7 - 30 mg/dL    Creatinine 0.94 0.66 - 1.25 mg/dL    Calcium 9.6 8.5 - 10.1 mg/dL    Glucose 97 70 - 99 mg/dL    Alkaline Phosphatase 61 40 - 150 U/L    AST 24 0 - 45 U/L    ALT 35 0 - 70 U/L    Protein Total 8.2 6.8 - 8.8 g/dL    Albumin 4.4 3.4 - 5.0 g/dL    Bilirubin Total 0.4 0.2 - 1.3 mg/dL    GFR Estimate >90 >60 mL/min/1.73m2      Comment:      Effective December 21, 2021 eGFRcr in adults is calculated using the 2021 CKD-EPI creatinine equation which includes age and gender (Jolly et al., NEJM, DOI: 10.1056/YHSCaa8852578)   CBC with platelets   Result Value Ref Range    WBC Count 7.4 4.0 - 11.0 10e3/uL    RBC Count 5.30 4.40 - 5.90 10e6/uL    Hemoglobin 15.6 13.3 - 17.7 g/dL    Hematocrit 44.4 40.0 - 53.0 %    MCV 84 78 - 100 fL    MCH 29.4 26.5 - 33.0 pg    MCHC 35.1 31.5 - 36.5 g/dL    RDW 12.8 10.0 - 15.0 %    Platelet Count 213 150 - 450 10e3/uL       If you have any questions or concerns, please call the clinic at the number listed above.       Sincerely,      Jackie Kay MD

## 2022-07-19 LAB
ALBUMIN SERPL-MCNC: 4.4 G/DL (ref 3.4–5)
ALP SERPL-CCNC: 61 U/L (ref 40–150)
ALT SERPL W P-5'-P-CCNC: 35 U/L (ref 0–70)
ANION GAP SERPL CALCULATED.3IONS-SCNC: 9 MMOL/L (ref 3–14)
AST SERPL W P-5'-P-CCNC: 24 U/L (ref 0–45)
BILIRUB SERPL-MCNC: 0.4 MG/DL (ref 0.2–1.3)
BUN SERPL-MCNC: 16 MG/DL (ref 7–30)
CALCIUM SERPL-MCNC: 9.6 MG/DL (ref 8.5–10.1)
CHLORIDE BLD-SCNC: 106 MMOL/L (ref 94–109)
CHOLEST SERPL-MCNC: 167 MG/DL
CO2 SERPL-SCNC: 24 MMOL/L (ref 20–32)
CREAT SERPL-MCNC: 0.94 MG/DL (ref 0.66–1.25)
FASTING STATUS PATIENT QL REPORTED: ABNORMAL
GFR SERPL CREATININE-BSD FRML MDRD: >90 ML/MIN/1.73M2
GLUCOSE BLD-MCNC: 97 MG/DL (ref 70–99)
HCV AB SERPL QL IA: NONREACTIVE
HDLC SERPL-MCNC: 32 MG/DL
HIV 1+2 AB+HIV1 P24 AG SERPL QL IA: NONREACTIVE
LDLC SERPL CALC-MCNC: 92 MG/DL
NONHDLC SERPL-MCNC: 135 MG/DL
POTASSIUM BLD-SCNC: 4.9 MMOL/L (ref 3.4–5.3)
PROT SERPL-MCNC: 8.2 G/DL (ref 6.8–8.8)
SODIUM SERPL-SCNC: 139 MMOL/L (ref 133–144)
TRIGL SERPL-MCNC: 216 MG/DL

## 2022-07-20 ENCOUNTER — OFFICE VISIT (OUTPATIENT)
Dept: DERMATOLOGY | Facility: CLINIC | Age: 37
End: 2022-07-20
Payer: COMMERCIAL

## 2022-07-20 VITALS
OXYGEN SATURATION: 98 % | HEART RATE: 82 BPM | SYSTOLIC BLOOD PRESSURE: 110 MMHG | TEMPERATURE: 98.4 F | DIASTOLIC BLOOD PRESSURE: 73 MMHG

## 2022-07-20 DIAGNOSIS — L70.0 ACNE VULGARIS: ICD-10-CM

## 2022-07-20 DIAGNOSIS — Q82.5 NEVUS COMEDONICUS: Primary | ICD-10-CM

## 2022-07-20 PROCEDURE — 99203 OFFICE O/P NEW LOW 30 MIN: CPT | Performed by: DERMATOLOGY

## 2022-07-20 RX ORDER — TRETINOIN 0.25 MG/G
CREAM TOPICAL AT BEDTIME
Qty: 45 G | Refills: 3 | Status: SHIPPED | OUTPATIENT
Start: 2022-07-20 | End: 2024-05-29

## 2022-07-20 ASSESSMENT — PAIN SCALES - GENERAL: PAINLEVEL: NO PAIN (0)

## 2022-07-20 NOTE — NURSING NOTE
Felipe Bradley's chief complaint for this visit includes:  Chief Complaint   Patient presents with     Derm Problem     Concern for lesion on face that is spreading.  Has been present since 2019.     PCP: Brendon Mendenhall    Referring Provider:  Referred Self, MD  No address on file    /73   Pulse 82   Temp 98.4  F (36.9  C) (Oral)   SpO2 98%   No Pain (0)      No Known Allergies      Do you need any medication refills at today's visit? no

## 2022-07-20 NOTE — PATIENT INSTRUCTIONS
Apply a pea sized amount of tretinoin to the entire face. This can cause some irritation, so begin by applying this every other night and slowly increase to nightly.

## 2022-07-20 NOTE — PROGRESS NOTES
[facility] Dermatology Note  Encounter Date: Jul 20, 2022  Office Visit     Dermatology Problem List:  1. 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 Hyperpigmented nodular plaque on the right malar cheek with multiple open comedones c/w nevus comedonicus. Differential diagnosis also includes nevus sebaceous or severe cystic acneiform plaque. Reviewed nevus sebaceous risks of progression to basal cell carcinoma. Reviewed that treatment for each of these conditions would be treated differently, and also reviewed treatment options, including isotretinoin, excision and topicals briefly.    - Will obtain records from [facility] to review biopsy results.   - Photograph obtained today.    2. Hyperpigmentation of the face   - Encouraged diligent sun protection   - Start tretinoin 0.025% cream to face. Instructed to apply topical acne medication once every other day and increase to nightly as tolerate.  Waiting 20-30 minutes after washing affected area(s) will decrease irritation. Method of application, side effects and expected results were discussed. The patient will apply pea size amount to the entire face, avoid areas around the eyes, corners of nose and mouth. Discussed side effects including photosensitivity and irritation.    Procedures Performed:   None.    Follow-up: Pending path results    Staff and Scribe:     Scribe Disclosure:   I, Everardo Diaz, am serving as a scribe to document services personally performed by this physician, Dr. Suleiman Melgoza, based on data collection and the provider's statements to me.     Provider Disclosure:   The documentation recorded by the scribe accurately reflects the services I personally performed and the decisions made by me.    Suleiman Melgoza MD   of  Dermatology  Department of Dermatology  [facility]      ____________________________________________    CC: Derm Problem (Concern for lesion on face that is spreading.  Has been present since 2019.)    HPI:  Mr. Felipe Bradley is a(n) 37 year old male who presents today as a new patient for a spot check.    Self referred.    Today, he has an area of concern on the face that has been spreading. He notes this has been present since 2019. This spot has changed in size and texture since he first noticed it. He has seen some puss come out of these areas. He is concerned that the area is spreading. Of note, he has seen [facility] in the past. This lesion was biopsied.    He has also had cysts removed in the past. He has concerns about hyperpigmentation of the face as well.    Patient is otherwise feeling well, without additional skin concerns.    Labs Reviewed:  N/A    Physical Exam:  Vitals: There were no vitals taken for this visit.  SKIN: Focused examination of the right cheek was performed.  - Hyperpigmented nodular plaque on the right malar cheek with multiple open comedones. Background hyperpigmentation on the cheeks with admixed acneiform scarring.  - No other lesions of concern on areas examined.     Medications:  No current outpatient medications on file.     No current facility-administered medications for this visit.      Past Medical History:   Patient Active Problem List   Diagnosis     Episodic tension-type headache, not intractable     Past Medical History:   Diagnosis Date     Anxiety 2020     Intertrigo 2022    uses lotrimin and powder     Tension headache 2020    resolved - not sure why this happened - maybe stress        CC Referred Self, MD  No address on file on close of this encounter.

## 2022-07-20 NOTE — LETTER
7/20/2022         RE: Felipe Bradley  [address]  [place] [address]        Dear Colleague,    Thank you for referring your patient, Felipe Bradley, to the [facility]. Please see a copy of my visit note below.    [facility] Dermatology Note  Encounter Date: Jul 20, 2022  Office Visit     Dermatology Problem List:  1. 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 Hyperpigmented nodular plaque on the right malar cheek with multiple open comedones c/w nevus comedonicus. Differential diagnosis also includes nevus sebaceous or severe cystic acneiform plaque. Reviewed nevus sebaceous risks of progression to basal cell carcinoma. Reviewed that treatment for each of these conditions would be treated differently, and also reviewed treatment options, including isotretinoin, excision and topicals briefly.    - Will obtain records from [facility] to review biopsy results.   - Photograph obtained today.    2. Hyperpigmentation of the face   - Encouraged diligent sun protection   - Start tretinoin 0.025% cream to face. Instructed to apply topical acne medication once every other day and increase to nightly as tolerate.  Waiting 20-30 minutes after washing affected area(s) will decrease irritation. Method of application, side effects and expected results were discussed. The patient will apply pea size amount to the entire face, avoid areas around the eyes, corners of nose and mouth. Discussed side effects including photosensitivity and irritation.    Procedures Performed:   None.    Follow-up: Pending path results    Staff and Scribe:     Scribe Disclosure:   I, Everardo Diaz, am serving as a scribe to document services personally performed by this physician, Dr. Suleiman Melgoza, based on data  collection and the provider's statements to me.     Provider Disclosure:   The documentation recorded by the scribe accurately reflects the services I personally performed and the decisions made by me.    Suleiman Melgoza MD   of Dermatology  Department of Dermatology  [facility]      ____________________________________________    CC: Derm Problem (Concern for lesion on face that is spreading.  Has been present since 2019.)    HPI:  Mr. Felipe Bradley is a(n) 37 year old male who presents today as a new patient for a spot check.    Self referred.    Today, he has an area of concern on the face that has been spreading. He notes this has been present since 2019. This spot has changed in size and texture since he first noticed it. He has seen some puss come out of these areas. He is concerned that the area is spreading. Of note, he has seen [facility] in the past. This lesion was biopsied.    He has also had cysts removed in the past. He has concerns about hyperpigmentation of the face as well.    Patient is otherwise feeling well, without additional skin concerns.    Labs Reviewed:  N/A    Physical Exam:  Vitals: There were no vitals taken for this visit.  SKIN: Focused examination of the right cheek was performed.  - Hyperpigmented nodular plaque on the right malar cheek with multiple open comedones. Background hyperpigmentation on the cheeks with admixed acneiform scarring.  - No other lesions of concern on areas examined.     Medications:  No current outpatient medications on file.     No current facility-administered medications for this visit.      Past Medical History:   Patient Active Problem List   Diagnosis     Episodic tension-type headache, not intractable     Past Medical History:   Diagnosis Date     Anxiety 2020     Intertrigo 2022    uses lotrimin and powder     Tension headache 2020    resolved - not sure why this happened - maybe stress         CC Referred Self, MD  No address on file on close of this encounter.      Again, thank you for allowing me to participate in the care of your patient.        Sincerely,        Suleiman Melgoza MD

## 2022-07-21 NOTE — NURSING NOTE
"Pathology result received. Sent for STAT scanning.    Date of Biopsy: 1/23/2020    \"Final diagnosis:  Skin (Professional), cheek, right  MULTIPLE INFUNDIBULAR CYSTS CONSISTENT WITH NEVUS COMEDONICUS    Microscopic Description:  The specimen consists of a punch biopsy of skin. The epidermis is demonstrating mild acanthosis. Multiple infundibular cysts are present. Some contain a small lanugo hair. The changes are compatible with a nevus comedonicus.\"    Routing to provider for review.  "

## 2022-09-18 ENCOUNTER — HEALTH MAINTENANCE LETTER (OUTPATIENT)
Age: 37
End: 2022-09-18

## 2022-09-20 ENCOUNTER — TELEPHONE (OUTPATIENT)
Dept: DERMATOLOGY | Facility: CLINIC | Age: 37
End: 2022-09-20

## 2022-09-20 DIAGNOSIS — Q82.5 NEVUS COMEDONICUS: Primary | ICD-10-CM

## 2022-09-20 NOTE — TELEPHONE ENCOUNTER
[facility] Call Center    Phone Message    May a detailed message be left on voicemail: yes     Reason for Call: Other: Pt states he never heard back after his 7/20/22 visit with any test results and would like a call back to discuss the next steps or treatment plan.      Pt states it's okay to respond in his MyChart if he doesn't answer the phone.     Please call Pt back to discuss. Thank you.     Action Taken: Message routed to:  Adult Clinics: Dermatology p [id number]    Travel Screening: Not Applicable

## 2022-09-20 NOTE — TELEPHONE ENCOUNTER
Dr. Melgoza, patient is calling to find out next steps based on pathology report received from [facility].    Please review and advise.  Stephanie Chan RN

## 2022-09-22 NOTE — TELEPHONE ENCOUNTER
Called to discuss outside biopsy results which were consistent with a nevus comedonicus. No answer, left voicemail.

## 2022-09-28 NOTE — TELEPHONE ENCOUNTER
Dr. Melgoza, is there a treatment recommendation we can discuss with the patient or would you try to reach him personally again?

## 2022-09-29 NOTE — TELEPHONE ENCOUNTER
Called to discuss treatment options. No answer, left voicemail. Spoke with derm surg and cosmetics about patient. Excision would be an option, though would likely require revision afterwards due to size and location. Will place derm surg referral for further discussion if patient amenable.

## 2022-09-30 ENCOUNTER — TELEPHONE (OUTPATIENT)
Dept: DERMATOLOGY | Facility: CLINIC | Age: 37
End: 2022-09-30

## 2022-09-30 NOTE — TELEPHONE ENCOUNTER
[facility] Call Center    Phone Message    May a detailed message be left on voicemail: yes     Reason for Call: Appointment Intake      Referring Provider Name:   Suleiman Melgoza MD in  DERM    Diagnosis and/or Symptoms:   Nevus comedonicus   nevus comedonicus excision consult      [facility] APPT  Referral Type: Derm Surgery  Dated 09/29/22    Notes say Please schedule with Dr. Kerwin Arambula in Derm Surg in [place] said this one goes to [facility]    I am supposed to route over all [facility] ones sitting in Referral Order workqueue for review - Thank you!        Action Taken: Message routed to:  Other: [facility]    Travel Screening: Not Applicable

## 2022-10-03 NOTE — TELEPHONE ENCOUNTER
Left voicemail for patient to call back and schedule.    Emelia mancia Procedure   Dermatology, General and Plastic Surgery, Urology Specialties   [facility] and Surgery [place] [id number]

## 2022-10-06 NOTE — TELEPHONE ENCOUNTER
Left voicemail to call back and schedule.    Emelia mancia Procedure   Dermatology, General and Plastic Surgery, Urology Specialties   [facility] and Surgery [address] [address]

## 2022-10-10 NOTE — TELEPHONE ENCOUNTER
Left voicemail for patient to call back and schedule.    Emelia mancia Procedure   Dermatology, General and Plastic Surgery, Urology Specialties   [facility] and Surgery [place] [id number]

## 2022-10-10 NOTE — TELEPHONE ENCOUNTER
Patient returned a call to the clinic to discuss scheduling appointments with Dr. Keenan or Dr. Hunter for excision of nevus comedonicus.  Patient saw Dr. Melgoza and an outside pathology result was reviewed.    Patient states he pays out of pocket for the dermatology specialty visits.  Would this excision be billed medically or as a cosmetic procedure?    He would prefer to not have another consult appointment prior to the procedure.  Discussed with patient that due to the size and location of the nevus comedonicus they may require a consult however, we can reach out to Dr. Keenan and Dr. Hunter to let them know patient would prefer not to have another consult if possible.

## 2022-10-11 NOTE — TELEPHONE ENCOUNTER
I require consults prior to all benign facial excisions.    If the lesion is totally asymmptomatic and is removed only because someone doesn't like the way it looks it is considered a cosmetic removal.  If it is symptomatic we can submit it to insurance.    Kerwin Hunter MD

## 2022-10-24 NOTE — TELEPHONE ENCOUNTER
Patient states the lesion itches and drains.    Informed consult needed first.    Scheduled consult 11/8. Waiting to schedule proc as he is wanting to check with insurance thereafter to discuss cost.    Chelsy Chun, CMA on 10/24/2022 at 1:07 PM

## 2023-02-13 ENCOUNTER — TELEPHONE (OUTPATIENT)
Dept: DERMATOLOGY | Facility: CLINIC | Age: 38
End: 2023-02-13
Payer: COMMERCIAL

## 2023-02-13 NOTE — TELEPHONE ENCOUNTER
Called and spoke to pt.     Scheduled both the consult and a procedure slot for Dr. Hunter at the  office. Pt understands that the consultation is required and that this procedure needs to take place at  because it is benign.     Dahlia Muñiz, Procedure  2/13/2023 8:55 AM

## 2023-02-13 NOTE — TELEPHONE ENCOUNTER
[facility] Call Center    Phone Message    May a detailed message be left on voicemail: yes     Reason for Call: Other: Pt states he had rescheduled to missed appointment from November 2022 in his MyChart but it isn't showing up. Pt would like to reschedule.     Missed visit was at  but he wants [facility]. Date of missed visit was 11/22/02 for consult/ discuss excision- Hyperpigmented nodular plaque on the right malar cheek.     Please call Pt back to discuss/schedule. Thank you.     Action Taken: Message routed to:  [facility] Derm    Travel Screening: Not Applicable

## 2023-03-14 ENCOUNTER — OFFICE VISIT (OUTPATIENT)
Dept: DERMATOLOGY | Facility: CLINIC | Age: 38
End: 2023-03-14
Payer: COMMERCIAL

## 2023-03-14 ASSESSMENT — PAIN SCALES - GENERAL: PAINLEVEL: NO PAIN (0)

## 2023-03-14 NOTE — Clinical Note
3/14/2023         RE: Felipe Bradley  [address]  [place] [address]        Dear Colleague,    Thank you for referring your patient, Felipe [place], to the [facility]. Please see a copy of my visit note below.    No notes on file    Again, thank you for allowing me to participate in the care of your patient.        Sincerely,        Kerwin Hunter MD

## 2023-03-14 NOTE — NURSING NOTE
Felipe Bradley's chief complaint for this visit includes:  Chief Complaint   Patient presents with     Consult For     Discussion of excision of hyperpigmented nodular plaque on right malar cheek     PCP: Brendon Mendenhall    Referring Provider:  No referring provider defined for this encounter.    There were no vitals taken for this visit.  No Pain (0)      No Known Allergies      Do you need any medication refills at today's visit? No    Threesa Nagy CMA

## 2023-03-27 ENCOUNTER — TELEPHONE (OUTPATIENT)
Dept: DERMATOLOGY | Facility: CLINIC | Age: 38
End: 2023-03-27
Payer: COMMERCIAL

## 2023-03-27 NOTE — TELEPHONE ENCOUNTER
I spoke with Felipe and rescheduled [place] [facility]  Stephanie Chan RN

## 2023-03-27 NOTE — TELEPHONE ENCOUNTER
[facility] Call Center    Phone Message    May a detailed message be left on voicemail: yes     Reason for Call: Other: Pt needs to reschedule the 3/28/23 procedure.      Writer did not cancel in Epic. Please call Pt back to reschedule. Thank you.     Action Taken: Message routed to:  Adult Clinics: Dermatology p [id number]    Travel Screening: Not Applicable

## 2023-06-05 ENCOUNTER — OFFICE VISIT (OUTPATIENT)
Dept: DERMATOLOGY | Facility: CLINIC | Age: 38
End: 2023-06-05
Payer: COMMERCIAL

## 2023-06-05 VITALS — DIASTOLIC BLOOD PRESSURE: 85 MMHG | SYSTOLIC BLOOD PRESSURE: 126 MMHG | HEART RATE: 72 BPM

## 2023-06-05 DIAGNOSIS — D48.5 NEOPLASM OF UNCERTAIN BEHAVIOR OF SKIN: Primary | ICD-10-CM

## 2023-06-05 PROCEDURE — 88305 TISSUE EXAM BY PATHOLOGIST: CPT | Mod: TC | Performed by: DERMATOLOGY

## 2023-06-05 PROCEDURE — 13132 CMPLX RPR F/C/C/M/N/AX/G/H/F: CPT | Mod: GC | Performed by: DERMATOLOGY

## 2023-06-05 PROCEDURE — 13133 CMPLX RPR F/C/C/M/N/AX/G/H/F: CPT | Mod: GC | Performed by: DERMATOLOGY

## 2023-06-05 PROCEDURE — 11446 EXC FACE-MM B9+MARG >4 CM: CPT | Mod: GC | Performed by: DERMATOLOGY

## 2023-06-05 PROCEDURE — 88305 TISSUE EXAM BY PATHOLOGIST: CPT | Mod: 26 | Performed by: PATHOLOGY

## 2023-06-05 ASSESSMENT — PAIN SCALES - GENERAL: PAINLEVEL: NO PAIN (0)

## 2023-06-05 NOTE — NURSING NOTE
Chief Complaint   Patient presents with     excision      Right malar cheek.      Parisa JOHN CMA

## 2023-06-05 NOTE — LETTER
6/5/2023       RE: Felipe [place]  [address]  [place] [address]     Dear Colleague,    Thank you for referring your patient, Felipe [place], to the [facility] DERMATOLOGIC SURGERY CLINIC [place] [facility]. Please see a copy of my visit note below.    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 Excision was indicated. We discussed the principles of treatment and most likely complications including scarring, bleeding, infection, incomplete excision, wound dehiscence, pain, nerve damage, and recurrence. Informed consent was obtained and the patient underwent the procedure as follows:    PROCEDURE: The patient was taken to the operative suite. Time-out was performed.  The treatment area was anesthetized with 1% lidocaine with epinephrine. The area was prepped with Chlorhexidine and rinsed with sterile saline and draped with sterile towels. The lesion was delineated and excised down to subcutaneous fat in a elliptical manner. Hemostasis was obtained by electrocoagulation.     REPAIR: A complex layered linear closure was selected as the procedure which would maximally preserve both function and cosmesis.    After the excision of the tumor, the area was carefully undermined. Hemostasis was obtained with electrocoagulation.  Closure was oriented so that the wound was in the patient's natural skin tension  lines. The subcutaneous layer was closed with three 4-0 vicryl plication / retention sutures. The subcutaneous and dermal layers were then closed with 4-0 monocryl sutures. The epidermis was then carefully approximated along the length of the wound using 5-0 prolene simple running sutures.     Estimated blood loss was less than 10 ml for all surgical sites. A sterile pressure dressing was applied and wound care instructions, with a written handout, were given. The patient was discharged from the Dermatologic Surgery Center alert and ambulatory.    The patient elected for pathology results to automatically release and understands that the clinical staff will contact them as soon as possible to notify them of the results.    Follow-up in 1 weeks for suture removal.    Dr. Hunter was immediately available for the entire surgery and was physicially present for the key portions of the procedure.    Anatomic Pathology Results: pending    Clinical Follow-Up: 1 week for suture removal     Staff Involved:  Resident/Staff       Attending attestation:  I was present for key elements of the procedure and immediately available for all other portions of the procedure.  I have reviewed the note and edited it as necessary.    Kerwin Hunter M.D.  Professor  Director of Dermatologic Surgery  Department of Dermatology  [facility]    Dermatology Surgery Clinic  [facility]  [address] [place], [place] [id number]

## 2023-06-05 NOTE — PROGRESS NOTES
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 Excision was indicated. We discussed the principles of treatment and most likely complications including scarring, bleeding, infection, incomplete excision, wound dehiscence, pain, nerve damage, and recurrence. Informed consent was obtained and the patient underwent the procedure as follows:    PROCEDURE: The patient was taken to the operative suite. Time-out was performed.  The treatment area was anesthetized with 1% lidocaine with epinephrine. The area was prepped with Chlorhexidine and rinsed with sterile saline and draped with sterile towels. The lesion was delineated and excised down to subcutaneous fat in a elliptical manner. Hemostasis was obtained by electrocoagulation.     REPAIR: A complex layered linear closure was selected as the procedure which would maximally preserve both function and cosmesis.    After the excision of the tumor, the area was carefully undermined. Hemostasis was obtained with electrocoagulation.  Closure was oriented so that the wound was in the patient's natural skin tension lines. The subcutaneous layer was closed with three 4-0 vicryl plication / retention sutures. The subcutaneous and dermal layers were then closed with 4-0 monocryl sutures. The epidermis was then carefully approximated along the length of the wound using 5-0 prolene simple running sutures.     Estimated blood loss was  less than 10 ml for all surgical sites. A sterile pressure dressing was applied and wound care instructions, with a written handout, were given. The patient was discharged from the Dermatologic Surgery Center alert and ambulatory.    The patient elected for pathology results to automatically release and understands that the clinical staff will contact them as soon as possible to notify them of the results.    Follow-up in 1 weeks for suture removal.    Dr. Hunter was immediately available for the entire surgery and was physicially present for the key portions of the procedure.    Anatomic Pathology Results: pending    Clinical Follow-Up: 1 week for suture removal     Staff Involved:  Resident/Staff       Attending attestation:  I was present for key elements of the procedure and immediately available for all other portions of the procedure.  I have reviewed the note and edited it as necessary.    Kerwin Hunter M.D.  Professor  Director of Dermatologic Surgery  Department of Dermatology  [facility]    Dermatology Surgery Clinic  [facility] and Surgery [place] [id number]

## 2023-06-05 NOTE — PATIENT INSTRUCTIONS
Wound care    I will experience scar, bleeding, swelling, pain, crusting and redness. I may experience incomplete removal or recurrence. Risks are bleeding, bruising, swelling, infection, nerve damage, & a large wound. A second procedure may be recommended to obtain the best cosmetic or functional result.       A three month office visit with your Surgeon is recommended for scar evaluation. Please reach out sooner if you have concerns about you surgical site/wound.    Caring for your skin after surgery    After your surgery, a pressure bandage will be placed over the area that has stitches. This is important to prevent bleeding. Please follow these instructions over the next 1 to 2 weeks. Following this regimen will help to prevent complications as your wound heals.     For the first 48 hours after your surgery:    Leave the pressure dressing on and keep it dry. If it should come loose, you may re-tape it, but do not take it off.  Relax and take it easy. Do not do any vigorous exercise or heavy lifting. This could cause the wound to bleed.  Post-Operative pain is usually mild. If you are able to take tylenol, You may take plain or extra-strength Tylenol (acetaminophen) As directed on the bottle (do not take more than 4,000mg in one day). If you are able to take ibuprofen, you can alternate the tylenol and ibuprofen.   Avoid alcohol as this may increase your tendency to bleed.   You may put an ice pack around the bandaged area for 20 minutes at a time as needed. This may help reduce swelling, bruising, and pain. Make sure the ice pack is waterproof so that the pressure bandage doesn t get wet.  If the wound is on the face try to sleep with your head elevated. Either in a recliner or propped up in bed, this will decrease swelling around the eyes.   You may see a small amount of drainage or blood on your pressure bandage. This is normal. However:  If drainage or bleeding continues or saturates the bandage, you will  "need to apply firm pressure over the bandage with a piece of gauze for 15 minutes.  If bleeding continues after applying pressure for 15 minutes, apply an ice pack to the bandaged area for 15 minutes.  If bleeding still continues, call our office or go to the nearest emergency room.    Remove pressure dressing 48 hours after surgery:    Carefully remove the pressure bandage. If it seems sticky or too difficult to get off, you may need to soak it off in the shower.  After the pressure dressing is removed, you may shower and get the wound wet. However, Do Not let the forceful stream of the shower hit the wound directly.  Follow these wound care and dressing change instructions:   In the shower wash the surgical site last with its own separate wash cloth.  You may allow water to run over the site. Take a clean wash cloth wet with soapy warm water and gently pat the suture site to help remove any crust or drainage.   Do Not rub or scrub the site    After site is clean pat dry and apply a thin layer of Vaseline ointment  over the suture site with a cotton swab or clean finger.   Cover the suture site with Telfa (non-stick) dressing. You may tape a piece of gauze over the Telfa for extra protection if you wish.  Continue wound care at least once a day, twice if you are active or around a contaminated environment.  Continue daily wound care until your surgical site is completely healed. To determine this, around 2 weeks post procedure you can take a cotton swab with a small amount of Hydrogen peroxide and roll it on the suture site. If the site bubbles and turns white your wound is still healing. Please continue wound care until the area no longer bubbles up from the hydrogen peroxide.       If you are able to take acetaminophen (\"Tylenol,\" etc.) and ibuprofen (\"Advil\" or \"Motrin,\" etc.), then you may STAGGER these medications by taking 400 mg of ibuprofen (usually two tabs) every 8 hours and 1,000 mg of acetaminophen " (e.g., two tabs of extra-strength Tylenol) every 8 hours.    This means, for example, that you could take the followin,000 mg of Tylenol, followed 4 hours later by 400 mg Ibuprofen, followed 4 hours later with 1,000 mg of Tylenol, followed 4 hours later by 400 mg Ibuprofen, followed 4 hours later with 1,000 mg of Tylenol, and so forth.     Essentially, you can take either 1,000 mg of Tylenol or 400 mg of ibuprofen in alternating fashion EVERY FOUR HOURS.    Do NOT exceed more than 4,000 mg of Tylenol or 3,200 mg of ibuprofen per 24 hours. If you are not able to take Tylenol or ibuprofen as above due to other health issues (or a physician has told you directly that you are not allowed to take one of them, say due to pre-existing severe liver or kidney issues), then disregard the above directions.    Scientific evidence supports that this combination/schedule of pain medications is just as effective, if not more effective, than taking a narcotic pain medicine.       Follow up will be a 3 month scar evaluation either in person or via a telephone visit with you sending in a photo via [employer]. Unless you have been told to follow up sooner or if you have concerns and would like to be see sooner. Please call or send us in a Well [employer] message if possible and attach a photo.        What to expect:    The first couple of days your wound may be tender and may bleed slightly when doing wound care.  There may be swelling and bruising around the wound, especially if it is near the eyes. For your comfort, you may apply ice or cold compresses to the bruises after your have removed the pressure bandage.  The area around your wound may be numb for several weeks or even months.  You may experience periodic sharp pain or mild itching around the wound as it heals.   The suture line will look dark for a while but will lighten over time.       When to call us:    You have bleeding that will not stop after applying pressure and ice.  You  have pain that is not controlled with Tylenol (acetaminophen.)  You have signs or symptoms of an infection such as:  Fever over 100 degrees Fahrenheit  Redness, warmth or foul-smelling drainage from the wound  If you have any questions, or are not sure how to take care of the wound.    Phone numbers:    During business hours (M-F 8:00-4:30 p.m.)  [facility]-  [phone] Option 1 appt. desk  [phone]  Option 3 nurse triage line  ---------------------------------------------------------  Evenings/Weekends/Holidays  Hospital - [phone]   TTY for hearing akrwsvrp-[phone]  *Ask  to page dermatologist on-call  Emergency Lkyf-[phone]  TTY for hearing impaired- [phone]

## 2023-06-06 ENCOUNTER — MYC MEDICAL ADVICE (OUTPATIENT)
Dept: DERMATOLOGY | Facility: CLINIC | Age: 38
End: 2023-06-06
Payer: COMMERCIAL

## 2023-06-06 ENCOUNTER — TELEPHONE (OUTPATIENT)
Dept: DERMATOLOGY | Facility: CLINIC | Age: 38
End: 2023-06-06
Payer: COMMERCIAL

## 2023-06-06 NOTE — CONFIDENTIAL NOTE
Follow up call completed following procedure with Dr. Hunter       Are you having pain? yes   Are you taking pain medication?   Are you applying ice?  yes  Have you had any noticeable bleeding through the bandage? no   Do you have any other concerns? Concerned about swelling, informed patient that swelling around the eye can last for a few days after surgery. Should start to get better after 48 hours. Provided number for patient to call if needed. No other concerns at time.         Please call [phone] option 3 if you have any questions or concerns.

## 2023-06-07 LAB
PATH REPORT.COMMENTS IMP SPEC: NORMAL
PATH REPORT.COMMENTS IMP SPEC: NORMAL
PATH REPORT.FINAL DX SPEC: NORMAL
PATH REPORT.GROSS SPEC: NORMAL
PATH REPORT.MICROSCOPIC SPEC OTHER STN: NORMAL
PATH REPORT.RELEVANT HX SPEC: NORMAL

## 2023-06-12 ENCOUNTER — ALLIED HEALTH/NURSE VISIT (OUTPATIENT)
Dept: DERMATOLOGY | Facility: CLINIC | Age: 38
End: 2023-06-12
Payer: COMMERCIAL

## 2023-06-12 DIAGNOSIS — D48.5 NEOPLASM OF UNCERTAIN BEHAVIOR OF SKIN: Primary | ICD-10-CM

## 2023-06-12 PROCEDURE — 99207 PR NO CHARGE NURSE ONLY: CPT | Performed by: DERMATOLOGY

## 2023-06-12 ASSESSMENT — PAIN SCALES - GENERAL: PAINLEVEL: NO PAIN (0)

## 2023-06-12 NOTE — NURSING NOTE
Chief Complaint   Patient presents with     Derm Problem     Patient is here today for removal of sutures.     Iesha SANDOVAL RN

## 2023-06-12 NOTE — PROGRESS NOTES
Felipe Bradley comes into clinic today at the request of Kerwin Hunter Ordering Provider for suture removal.      This service provided today was under the supervising provider of the day Kerwin Hunter, who was available if needed.    Iesha [place] RN

## 2023-06-19 ENCOUNTER — PATIENT OUTREACH (OUTPATIENT)
Dept: CARE COORDINATION | Facility: CLINIC | Age: 38
End: 2023-06-19
Payer: COMMERCIAL

## 2023-10-08 ENCOUNTER — HEALTH MAINTENANCE LETTER (OUTPATIENT)
Age: 38
End: 2023-10-08

## 2024-05-29 ENCOUNTER — OFFICE VISIT (OUTPATIENT)
Dept: FAMILY MEDICINE | Facility: CLINIC | Age: 39
End: 2024-05-29
Payer: COMMERCIAL

## 2024-05-29 VITALS
WEIGHT: 179.4 LBS | SYSTOLIC BLOOD PRESSURE: 137 MMHG | BODY MASS INDEX: 27.19 KG/M2 | HEIGHT: 68 IN | HEART RATE: 60 BPM | DIASTOLIC BLOOD PRESSURE: 83 MMHG | RESPIRATION RATE: 16 BRPM | OXYGEN SATURATION: 97 % | TEMPERATURE: 97.9 F

## 2024-05-29 DIAGNOSIS — Z00.00 ROUTINE GENERAL MEDICAL EXAMINATION AT A HEALTH CARE FACILITY: Primary | ICD-10-CM

## 2024-05-29 DIAGNOSIS — Z83.3 FAMILY HISTORY OF DIABETES MELLITUS: ICD-10-CM

## 2024-05-29 DIAGNOSIS — Z13.220 SCREENING FOR HYPERLIPIDEMIA: ICD-10-CM

## 2024-05-29 LAB — HBA1C MFR BLD: 5.6 % (ref 0–5.6)

## 2024-05-29 PROCEDURE — 90636 HEP A/HEP B VACC ADULT IM: CPT | Performed by: FAMILY MEDICINE

## 2024-05-29 PROCEDURE — 80061 LIPID PANEL: CPT | Performed by: FAMILY MEDICINE

## 2024-05-29 PROCEDURE — 36415 COLL VENOUS BLD VENIPUNCTURE: CPT | Performed by: FAMILY MEDICINE

## 2024-05-29 PROCEDURE — 90471 IMMUNIZATION ADMIN: CPT | Performed by: FAMILY MEDICINE

## 2024-05-29 PROCEDURE — 99396 PREV VISIT EST AGE 40-64: CPT | Mod: 25 | Performed by: FAMILY MEDICINE

## 2024-05-29 PROCEDURE — 83036 HEMOGLOBIN GLYCOSYLATED A1C: CPT | Performed by: FAMILY MEDICINE

## 2024-05-29 SDOH — HEALTH STABILITY: PHYSICAL HEALTH: ON AVERAGE, HOW MANY DAYS PER WEEK DO YOU ENGAGE IN MODERATE TO STRENUOUS EXERCISE (LIKE A BRISK WALK)?: 4 DAYS

## 2024-05-29 ASSESSMENT — SOCIAL DETERMINANTS OF HEALTH (SDOH)
DO YOU BELONG TO ANY CLUBS OR ORGANIZATIONS SUCH AS CHURCH GROUPS UNIONS, FRATERNAL OR ATHLETIC GROUPS, OR SCHOOL GROUPS?: NO
HOW OFTEN DO YOU ATTENT MEETINGS OF THE CLUB OR ORGANIZATION YOU BELONG TO?: NEVER
HOW OFTEN DO YOU GET TOGETHER WITH FRIENDS OR RELATIVES?: PATIENT DECLINED
HOW OFTEN DO YOU ATTEND CHURCH OR RELIGIOUS SERVICES?: MORE THAN 4 TIMES PER YEAR
IN A TYPICAL WEEK, HOW MANY TIMES DO YOU TALK ON THE PHONE WITH FAMILY, FRIENDS, OR NEIGHBORS?: TWICE A WEEK

## 2024-05-29 ASSESSMENT — LIFESTYLE VARIABLES
HOW OFTEN DO YOU HAVE SIX OR MORE DRINKS ON ONE OCCASION: NEVER
HOW OFTEN DO YOU HAVE A DRINK CONTAINING ALCOHOL: NEVER
SKIP TO QUESTIONS 9-10: 1
HOW MANY STANDARD DRINKS CONTAINING ALCOHOL DO YOU HAVE ON A TYPICAL DAY: PATIENT DOES NOT DRINK
AUDIT-C TOTAL SCORE: 0

## 2024-05-29 NOTE — PATIENT INSTRUCTIONS
"Preventive Care Advice   This is general advice we often give to help people stay healthy. Your care team may have specific advice just for you. Please talk to your care team about your own preventive care needs.  Lifestyle  Exercise at least 150 minutes each week (30 minutes a day, 5 days a week).  Do muscle strengthening activities 2 days a week. These help control your weight and prevent disease.  No smoking.  Wear sunscreen to prevent skin cancer.  Have your home tested for radon every 2 to 5 years. Radon is a colorless, odorless gas that can harm your lungs. To learn more, go to www.health.[facility].mn. and search for \"Radon in Homes.\"  Keep guns unloaded and locked up in a safe place like a safe or gun vault, or, use a gun lock and hide the keys. Always lock away bullets separately. To learn more, visit [employer].[place].gov and search for \"safe gun storage.\"  Nutrition  Eat 5 or more servings of fruits and vegetables each day.  Try wheat bread, brown rice and whole grain pasta (instead of white bread, rice, and pasta).  Get enough calcium and vitamin D. Check the label on foods and aim for 100% of the RDA (recommended daily allowance).  Regular exams  Have a dental exam and cleaning every 6 months.  See your health care team every year to talk about:  Any changes in your health.  Any medicines your care team has prescribed.  Preventive care, family planning, and ways to prevent chronic diseases.  Shots (vaccines)   HPV shots (up to age 26), if you've never had them before.  Hepatitis B shots (up to age 59), if you've never had them before.  COVID-19 shot: Get this shot when it's due.  Flu shot: Get a flu shot every year.  Tetanus shot: Get a tetanus shot every 10 years.  Pneumococcal, hepatitis A, and RSV shots: Ask your care team if you need these based on your risk.  Shingles shot (for age 50 and up).  General health tests  Diabetes screening:  Starting at age 35, Get screened for diabetes at least every 3 years.  If " you are younger than age 35, ask your care team if you should be screened for diabetes.  Cholesterol test: At age 39, start having a cholesterol test every 5 years, or more often if advised.  Bone density scan (DEXA): At age 50, ask your care team if you should have this scan for osteoporosis (brittle bones).  Hepatitis C: Get tested at least once in your life.  Abdominal aortic aneurysm screening: Talk to your doctor about having this screening if you:  Have ever smoked; and  Are biologically male; and  Are between the ages of 65 and 75.  STIs (sexually transmitted infections)  Before age 24: Ask your care team if you should be screened for STIs.  After age 24: Get screened for STIs if you're at risk. You are at risk for STIs (including HIV) if:  You are sexually active with more than one person.  You don't use condoms every time.  You or a partner was diagnosed with a sexually transmitted infection.  If you are at risk for HIV, ask about PrEP medicine to prevent HIV.  Get tested for HIV at least once in your life, whether you are at risk for HIV or not.  Cancer screening tests  Cervical cancer screening: If you have a cervix, begin getting regular cervical cancer screening tests at age 21. Most people who have regular screenings with normal results can stop after age 65. Talk about this with your provider.  Breast cancer scan (mammogram): If you've ever had breasts, begin having regular mammograms starting at age 40. This is a scan to check for breast cancer.  Colon cancer screening: It is important to start screening for colon cancer at age 45.  Have a colonoscopy test every 10 years (or more often if you're at risk) Or, ask your provider about stool tests like a FIT test every year or Cologuard test every 3 years.  To learn more about your testing options, visit: www.[employer]/467297.pdf.  For help making a decision, visit: dae/jo65644.  Prostate cancer screening test: If you have a prostate and are age 55  to 69, ask your provider if you would benefit from a yearly prostate cancer screening test.  Lung cancer screening: If you are a current or former smoker age 50 to 80, ask your care team if ongoing lung cancer screenings are right for you.  For informational purposes only. Not to replace the advice of your health care provider. Copyright   2023 [employer]. All rights reserved. Clinically reviewed by the [facility] Transitions Program. Yieldr [id number] - REV 04/24.    Learning About Stress  What is stress?     Stress is your body's response to a hard situation. Your body can have a physical, emotional, or mental response. Stress is a fact of life for most people, and it affects everyone differently. What causes stress for you may not be stressful for someone else.  A lot of things can cause stress. You may feel stress when you go on a job interview, take a test, or run a race. This kind of short-term stress is normal and even useful. It can help you if you need to work hard or react quickly. For example, stress can help you finish an important job on time.  Long-term stress is caused by ongoing stressful situations or events. Examples of long-term stress include long-term health problems, ongoing problems at work, or conflicts in your family. Long-term stress can harm your health.  How does stress affect your health?  When you are stressed, your body responds as though you are in danger. It makes hormones that speed up your heart, make you breathe faster, and give you a burst of energy. This is called the fight-or-flight stress response. If the stress is over quickly, your body goes back to normal and no harm is done.  But if stress happens too often or lasts too long, it can have bad effects. Long-term stress can make you more likely to get sick, and it can make symptoms of some diseases worse. If you tense up when you are stressed, you may develop neck, shoulder, or low back pain. Stress is  linked to high blood pressure and heart disease.  Stress also harms your emotional health. It can make you izquierdo, tense, or depressed. Your relationships may suffer, and you may not do well at work or school.  What can you do to manage stress?  You can try these things to help manage stress:   Do something active. Exercise or activity can help reduce stress. Walking is a great way to get started. Even everyday activities such as housecleaning or yard work can help.  Try yoga or jairo chi. These techniques combine exercise and meditation. You may need some training at first to learn them.  Do something you enjoy. For example, listen to music or go to a movie. Practice your hobby or do volunteer work.  Meditate. This can help you relax, because you are not worrying about what happened before or what may happen in the future.  Do guided imagery. Imagine yourself in any setting that helps you feel calm. You can use online videos, books, or a teacher to guide you.  Do breathing exercises. For example:  From a standing position, bend forward from the waist with your knees slightly bent. Let your arms dangle close to the floor.  Breathe in slowly and deeply as you return to a standing position. Roll up slowly and lift your head last.  Hold your breath for just a few seconds in the standing position.  Breathe out slowly and bend forward from the waist.  Let your feelings out. Talk, laugh, cry, and express anger when you need to. Talking with supportive friends or family, a counselor, or a rashel leader about your feelings is a healthy way to relieve stress. Avoid discussing your feelings with people who make you feel worse.  Write. It may help to write about things that are bothering you. This helps you find out how much stress you feel and what is causing it. When you know this, you can find better ways to cope.  What can you do to prevent stress?  You might try some of these things to help prevent stress:  Manage your time.  "This helps you find time to do the things you want and need to do.  Get enough sleep. Your body recovers from the stresses of the day while you are sleeping.  Get support. Your family, friends, and community can make a difference in how you experience stress.  Limit your news feed. Avoid or limit time on social media or news that may make you feel stressed.  Do something active. Exercise or activity can help reduce stress. Walking is a great way to get started.  Where can you learn more?  Go to https://www.[employer] [facility].net/patiented  Enter N032 in the search box to learn more about \"Learning About Stress.\"  Current as of: October 24, 2023               Content Version: 14.0    3221-1894 "[employer].   Care instructions adapted under license by your healthcare professional. If you have questions about a medical condition or this instruction, always ask your healthcare professional. "[employer] disclaims any warranty or liability for your use of this information.      "

## 2024-05-29 NOTE — COMMUNITY RESOURCES LIST (ENGLISH)
May 29, 2024           YOUR PERSONALIZED LIST OF SERVICES & PROGRAMS               Bill Payment Assistance      [facility] [place] - [employer] - [facility]  [address] [place] [address] (Distance: 4.8 miles)  Phone: [phone]  Language: English  Fee: Free  Accessibility: Ada accessible      [employer] - Utility Payment Assistance  [address] [place] [address] (Distance: 5.6 miles)  Phone: [phone]  Website: https://[address] [place], [place] [address]/resources/resource-centers/  Language: English      - Dislocated Worker/Adult [employer] Employment Program  Phone: [phone]) [phone]  Email: oly@[employer]  Website: https://[employer]/services/employment-services/dislocated-worker-program/  Language: English, Beninese  Hours: Mon 8:00 AM - 4:30 PM Tue 8:00 AM - 4:30 PM Wed 8:00 AM - 4:30 PM Thu 8:00 AM - 4:30 PM Fri 8:00 AM - 4:30 PM  Fee: Free  Accessibility: Ada accessible               IMPORTANT NUMBERS & WEBSITES        Emergency Services  911  .   United Way  211 http://211unitedway.org  .   Poison Control  (582) 189-7543 http://[place]poison.org http://wis[place]poison.org  .     Suicide and Crisis Lifeline  988 http://988lifeline.org  .   Childhelp National Child Abuse Hotline  165.926.3433 http://Childhelphotline.org   .   National Sexual Assault Hotline  (732) 699-3019 (HOPE) http://Rainn.org   .     National Runaway Safeline  (263) 130-5575 (RUNAWAY) http://1800runaway.org  .   Pregnancy & Postpartum Support  Call/text 046-168-8640  [place]: http://ppsupportmn.org  [place]: http://[employer].com/wi  .   Substance Abuse National Helpline ([facility])  789-302-HELP (1887) http://Findtreatment.gov   .                DISCLAIMER: These resources have been generated via the [employer] Platform. [employer] does not endorse any service providers mentioned in this resource list. Margret Hobson does not guarantee that the services mentioned in this resource list will be available to you or  will improve your health or wellness.    [facility]

## 2024-05-29 NOTE — PROGRESS NOTES
"Preventive Care Visit  [facility]  Jackie Kay MD, Family Medicine  May 29, 2024      Assessment & Plan     Routine general medical examination at a health care facility    - Lipid panel reflex to direct LDL Fasting  - Lipid panel reflex to direct LDL Fasting    Family history of diabetes mellitus    - Hemoglobin A1c  - Hemoglobin A1c    Screening for hyperlipidemia    - Lipid panel reflex to direct LDL Fasting  - Lipid panel reflex to direct LDL Fasting        BMI  Estimated body mass index is 27.28 kg/m  as calculated from the following:    Height as of this encounter: 1.727 m (5' 8\").    Weight as of this encounter: 81.4 kg (179 lb 6.4 oz).   Weight management plan: Discussed healthy diet and exercise guidelines      FUTURE APPOINTMENTS:       - Follow-up visit in 6 months   See Patient Instructions    Jake Wang is a 39 year old, presenting for the following:  Physical        5/29/2024     3:27 PM   Additional Questions   Roomed by Regi   Accompanied by Wife -Ynes        Health Care Directive  Patient does not have a Health Care Directive or Living Will: discussed 2 years ago     HPI  See above           5/29/2024   General Health   Feel stress (tense, anxious, or unable to sleep) Only a little   (!) STRESS CONCERN      7/18/2022   Nutrition   Three or more servings of calcium each day? Yes   Diet: regular (no restrictions)         5/29/2024   Exercise   Days per week of moderate/strenous exercise 4 days         5/29/2024   Social Factors   Frequency of gathering with friends or relatives Patient declined   Worry food won't last until get money to buy more No   Food not last or not have enough money for food? No   Do you have housing?  Yes   Are you worried about losing your housing? No   Lack of transportation? No   Unable to get utilities (heat,electricity)? Yes   (!) FINANCIAL RESOURCE STRAIN CONCERN      7/18/2022   Dental   Dentist two times every year? Yes      "       Today's PHQ-2 Score:       5/29/2024     3:12 PM   PHQ-2 ( 1999 [employer])   Q1: Little interest or pleasure in doing things 0   Q2: Feeling down, depressed or hopeless 0   PHQ-2 Score 0   Q1: Little interest or pleasure in doing things Not at all   Q2: Feeling down, depressed or hopeless Not at all   PHQ-2 Score 0           5/29/2024   Substance Use   Frequency of drinking alcohol? Never     Social History     Tobacco Use    Smoking status: Former    Smokeless tobacco: Never    Tobacco comments:     smoked for 6 years and then stopped   Vaping Use    Vaping status: Never Used   Substance Use Topics    Alcohol use: No    Drug use: No       ASCVD Risk   The 10-year ASCVD risk score (Ghislaine LARSON, et al., 2019) is: 1.7%    Values used to calculate the score:      Age: 39 years      Sex: Male      Is Non- : No      Diabetic: No      Tobacco smoker: No      Systolic Blood Pressure: 137 mmHg      Is BP treated: No      HDL Cholesterol: 32 mg/dL      Total Cholesterol: 167 mg/dL         No data to display                 Reviewed and updated as needed this visit by Provider                    BP Readings from Last 3 Encounters:   05/29/24 137/83   06/05/23 126/85   07/20/22 110/73    Wt Readings from Last 3 Encounters:   05/29/24 81.4 kg (179 lb 6.4 oz)   07/18/22 82.1 kg (181 lb)   04/06/21 86.2 kg (190 lb)                  Patient Active Problem List   Diagnosis    Episodic tension-type headache, not intractable     Past Surgical History:   Procedure Laterality Date    NO HISTORY OF SURGERY         Social History     Tobacco Use    Smoking status: Former    Smokeless tobacco: Never    Tobacco comments:     smoked for 6 years and then stopped   Substance Use Topics    Alcohol use: No     Family History   Problem Relation Age of Onset    Diabetes Type 2  Mother 64    Coronary Artery Disease Father 58         No current outpatient medications on file.     No Known Allergies      Review of  "Systems  Constitutional, HEENT, cardiovascular, pulmonary, gi and gu systems are negative, except as otherwise noted.     Objective    Exam  /83 (BP Location: Left arm, Patient Position: Sitting, Cuff Size: Adult Regular)   Pulse 60   Temp 97.9  F (36.6  C) (Oral)   Resp 16   Ht 1.727 m (5' 8\")   Wt 81.4 kg (179 lb 6.4 oz)   SpO2 97%   BMI 27.28 kg/m     Estimated body mass index is 27.28 kg/m  as calculated from the following:    Height as of this encounter: 1.727 m (5' 8\").    Weight as of this encounter: 81.4 kg (179 lb 6.4 oz).    Physical Exam  GENERAL: alert and no distress  EYES: Eyes grossly normal to inspection, PERRL and conjunctivae and sclerae normal  HENT: ear canals and TM's normal, nose and mouth without ulcers or lesions  NECK: no adenopathy, no asymmetry, masses, or scars  RESP: lungs clear to auscultation - no rales, rhonchi or wheezes  CV: regular rate and rhythm, normal S1 S2, no S3 or S4, no murmur, click or rub, no peripheral edema  ABDOMEN: soft, nontender, no hepatosplenomegaly, no masses and bowel sounds normal  MS: no gross musculoskeletal defects noted, no edema  SKIN: no suspicious lesions or rashes  NEURO: Normal strength and tone, mentation intact and speech normal  PSYCH: mentation appears normal, affect normal/bright  LYMPH: no cervical, supraclavicular, axillary, or inguinal adenopathy        Signed Electronically by: Jackie Kay MD    "

## 2024-05-31 LAB
CHOLEST SERPL-MCNC: 145 MG/DL
FASTING STATUS PATIENT QL REPORTED: NO
HDLC SERPL-MCNC: 33 MG/DL
LDLC SERPL CALC-MCNC: 82 MG/DL
NONHDLC SERPL-MCNC: 112 MG/DL
TRIGL SERPL-MCNC: 151 MG/DL

## 2025-04-29 ENCOUNTER — PATIENT OUTREACH (OUTPATIENT)
Dept: CARE COORDINATION | Facility: CLINIC | Age: 40
End: 2025-04-29
Payer: COMMERCIAL

## 2025-05-13 ENCOUNTER — PATIENT OUTREACH (OUTPATIENT)
Dept: CARE COORDINATION | Facility: CLINIC | Age: 40
End: 2025-05-13
Payer: COMMERCIAL

## 2025-07-19 ENCOUNTER — HEALTH MAINTENANCE LETTER (OUTPATIENT)
Age: 40
End: 2025-07-19